# Patient Record
Sex: MALE | URBAN - METROPOLITAN AREA
[De-identification: names, ages, dates, MRNs, and addresses within clinical notes are randomized per-mention and may not be internally consistent; named-entity substitution may affect disease eponyms.]

---

## 2021-02-04 ENCOUNTER — OFFICE VISIT (OUTPATIENT)
Dept: URBAN - METROPOLITAN AREA CLINIC 48 | Facility: CLINIC | Age: 66
End: 2021-02-04
Payer: MEDICARE

## 2021-02-04 DIAGNOSIS — E11.9 TYPE 2 DIABETES MELLITUS W/O COMPLICATION: Primary | ICD-10-CM

## 2021-02-04 PROCEDURE — 92004 COMPRE OPH EXAM NEW PT 1/>: CPT | Performed by: OPTOMETRIST

## 2021-02-04 ASSESSMENT — INTRAOCULAR PRESSURE
OD: 16
OS: 18

## 2021-02-04 NOTE — IMPRESSION/PLAN
Impression: Type 2 diabetes mellitus w/o complication: S61.0. Plan: No Diabetic Retinopathy present on exam. No treatment is needed. Discussed with patient the importance of glucose control and ocular risk to prevent Retinopathy.  

Follow up annually with dilated fundus exam.

## 2021-05-13 ENCOUNTER — OFFICE VISIT (OUTPATIENT)
Dept: URBAN - METROPOLITAN AREA CLINIC 48 | Facility: CLINIC | Age: 66
End: 2021-05-13
Payer: MEDICARE

## 2021-05-13 DIAGNOSIS — H10.89 OTHER CONJUNCTIVITIS: Primary | ICD-10-CM

## 2021-05-13 PROCEDURE — 92014 COMPRE OPH EXAM EST PT 1/>: CPT | Performed by: OPTOMETRIST

## 2021-05-13 RX ORDER — NEOMYCIN SULFATE, POLYMYXIN B SULFATE AND DEXAMETHASONE 3.5; 10000; 1 MG/ML; [USP'U]/ML; MG/ML
SUSPENSION/ DROPS OPHTHALMIC
Qty: 5 | Refills: 2 | Status: ACTIVE
Start: 2021-05-13

## 2021-05-13 ASSESSMENT — INTRAOCULAR PRESSURE
OD: 17
OS: 14

## 2021-05-13 NOTE — IMPRESSION/PLAN
Impression: Other conjunctivitis: H10.89.
OS>OD Plan: Discussed with pt. likely bacterial inf. vs virus. Will treat treat with Dimitry-Poly-Dex. to help reduce inflammation. OS Will start Dimitry-Poly-Dex gtts. TID x 5 days. RTC 4-6 wks.

## 2021-06-16 ENCOUNTER — OFFICE VISIT (OUTPATIENT)
Dept: URBAN - METROPOLITAN AREA CLINIC 48 | Facility: CLINIC | Age: 66
End: 2021-06-16
Payer: MEDICARE

## 2021-06-16 PROCEDURE — 99213 OFFICE O/P EST LOW 20 MIN: CPT | Performed by: OPTOMETRIST

## 2021-06-16 ASSESSMENT — INTRAOCULAR PRESSURE
OD: 18
OS: 19